# Patient Record
Sex: OTHER/UNKNOWN | URBAN - METROPOLITAN AREA
[De-identification: names, ages, dates, MRNs, and addresses within clinical notes are randomized per-mention and may not be internally consistent; named-entity substitution may affect disease eponyms.]

---
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
## 2024-01-15 ENCOUNTER — APPOINTMENT (OUTPATIENT)
Dept: URBAN - METROPOLITAN AREA CLINIC 314 | Age: 24
Setting detail: DERMATOLOGY
End: 2024-01-15

## 2024-01-15 DIAGNOSIS — L20.89 OTHER ATOPIC DERMATITIS: ICD-10-CM

## 2024-01-15 PROCEDURE — OTHER MIPS QUALITY: OTHER

## 2024-01-15 PROCEDURE — OTHER COUNSELING: OTHER

## 2024-01-15 PROCEDURE — OTHER ADDITIONAL NOTES: OTHER

## 2024-01-15 PROCEDURE — 99204 OFFICE O/P NEW MOD 45 MIN: CPT

## 2024-01-15 PROCEDURE — OTHER PRESCRIPTION MEDICATION MANAGEMENT: OTHER

## 2024-01-15 PROCEDURE — OTHER PRESCRIPTION: OTHER

## 2024-01-15 RX ORDER — RUXOLITINIB 15 MG/G
CREAM TOPICAL BID
Qty: 60 | Refills: 1 | Status: ERX | COMMUNITY
Start: 2024-01-15

## 2024-01-15 ASSESSMENT — LOCATION ZONE DERM: LOCATION ZONE: HAND

## 2024-01-15 ASSESSMENT — LOCATION SIMPLE DESCRIPTION DERM
LOCATION SIMPLE: RIGHT HAND
LOCATION SIMPLE: LEFT HAND

## 2024-01-15 ASSESSMENT — LOCATION DETAILED DESCRIPTION DERM
LOCATION DETAILED: LEFT ULNAR DORSAL HAND
LOCATION DETAILED: RIGHT ULNAR DORSAL HAND

## 2024-01-15 NOTE — PROCEDURE: ADDITIONAL NOTES
Additional Notes: -\\n2% BSA (atopic derm affecting hands)\\nHas tried potent topical steroid (clobetasol, halobetasol) and Tacrolimus with slight improvement but dermatitis persists.\\nHas previously used Dupixent with good response but went to college and it fell off; only requires medication a few months out of the year currently so not interested at this time to resume.\\nDiscussed Opzelura. Pt interested in trial. RBSE reviewed.
Render Risk Assessment In Note?: no
Detail Level: Simple

## 2024-01-15 NOTE — PROCEDURE: PRESCRIPTION MEDICATION MANAGEMENT
Samples Given: Neutrogena [place] cream
Continue Regimen: -\\nHalobetasol PRN/Clobetasol PRN severe flares, reviewed RBSE and appropriate use.\\n-\\nTacrolimus (maintenance/mild flares)\\nPt has enough and can call for RF's
Render In Strict Bullet Format?: No
Detail Level: Zone
Initiate Treatment: -\\nOpzelura 1.5 % topical cream BID

## 2024-03-18 ENCOUNTER — APPOINTMENT (OUTPATIENT)
Dept: URBAN - METROPOLITAN AREA CLINIC 314 | Age: 24
Setting detail: DERMATOLOGY
End: 2024-03-18

## 2024-03-18 DIAGNOSIS — L20.89 OTHER ATOPIC DERMATITIS: ICD-10-CM

## 2024-03-18 PROCEDURE — OTHER COUNSELING: OTHER

## 2024-03-18 PROCEDURE — OTHER DUPIXENT INITIATION: OTHER

## 2024-03-18 PROCEDURE — 99214 OFFICE O/P EST MOD 30 MIN: CPT

## 2024-03-18 PROCEDURE — OTHER PRESCRIPTION MEDICATION MANAGEMENT: OTHER

## 2024-03-18 PROCEDURE — OTHER ADDITIONAL NOTES: OTHER

## 2024-03-18 ASSESSMENT — LOCATION ZONE DERM: LOCATION ZONE: HAND

## 2024-03-18 ASSESSMENT — LOCATION DETAILED DESCRIPTION DERM
LOCATION DETAILED: LEFT ULNAR DORSAL HAND
LOCATION DETAILED: RIGHT ULNAR DORSAL HAND

## 2024-03-18 ASSESSMENT — LOCATION SIMPLE DESCRIPTION DERM
LOCATION SIMPLE: LEFT HAND
LOCATION SIMPLE: RIGHT HAND

## 2024-03-18 NOTE — PROCEDURE: ADDITIONAL NOTES
Additional Notes: -\\n2% BSA (atopic derm affecting hands)\\nHas tried potent topical steroid (clobetasol, halobetasol) and Tacrolimus with slight improvement but dermatitis persists.\\nPatient is currently flaring despite use of topicals, discussed Dupixent and would like to proceed\\nOk to sample patient starter dose if we obtain samples before approval
Render Risk Assessment In Note?: no
Detail Level: Simple

## 2024-03-18 NOTE — PROCEDURE: PRESCRIPTION MEDICATION MANAGEMENT
Continue Regimen: -\\nHalobetasol PRN/Clobetasol PRN severe flares, reviewed RBSE and appropriate use.\\nOpzelura 1.5 % topical cream BID\\nTacrolimus (maintenance/mild flares)
Render In Strict Bullet Format?: No
Detail Level: Zone